# Patient Record
Sex: MALE | Race: BLACK OR AFRICAN AMERICAN | NOT HISPANIC OR LATINO | Employment: FULL TIME | URBAN - METROPOLITAN AREA
[De-identification: names, ages, dates, MRNs, and addresses within clinical notes are randomized per-mention and may not be internally consistent; named-entity substitution may affect disease eponyms.]

---

## 2024-11-24 NOTE — PROGRESS NOTES
"Last visit 2022  #Erectile Dysfunction  -continue Cialis 20mg prn - med counseling done - refilled     f/u in 1 year    Today's visit:  #Erectile Dysfunction  -taking cialis 20mg prn --> 7/10 rigidity  -no priapism, pain  -mostly lasting long enough  -no back pain  -no new heart issues/ntg    Cardiology note x2 reviewed  Bmp, lipids from OSH reviewed     -s/p prostatectomy: no  -Hernia Repair? no  -on nitrates/NTG?: no  -smoker? no     -partner: yes, here     -Tried PDE5is?: Yes  ---Viagra/sildenafil - response: 50mg prn - harder but not as hard, Also tired ciails 10mg  - Libido/Desire: strong  - been on Testosterone /anabolic steroids?   -Pain or curvature in penis when erect: none  -Premature or delayed ejaculation: none     LDL high  E mildly high  labs all reviewed, otherwise wnl       Labs  No results found for: \"TESTOSTERONE\"  Lab Results   Component Value Date    LH 5.9 10/09/2021     Lab Results   Component Value Date    FSH 5.5 10/09/2021     No components found for: \"ESTRADIAL\"  Lab Results   Component Value Date    PSA 0.62 10/09/2021     No components found for: \"CBC\"  Lab Results   Component Value Date    PROLACTIN 6.8 10/09/2021     Lab Results   Component Value Date    HGBA1C 5.5 10/09/2021     No components found for: \"HEMATOCRIT\"      Medications:  No current outpatient medications on file.    Allergy:  Not on File     Exam  CONSTITUTIONAL:        No acute distress    HEAD:        Normocephalic and atraumatic    CHEST / RESPIRATORY      no excess work of breathing, no respiratory distress,    ABDOMEN / GASTROINTESTINAL:        Abdomen nondistended        Testicles descended bilaterally, nontender, no masses  Vasa palpable bilaterally  Penis circ'd, no lesions, no plaques      Assessment/Plan  #Erectile Dysfunction  -continue Cialis 20mg prn - med counseling done - refilled     f/u in 1 year with Lynda Montenegro Attestation  By signing my name below, Jessica MCKINNEY Scribe   attest that this " documentation has been prepared under the direction and in the presence of Aaron Romano MD.

## 2024-11-25 ENCOUNTER — OFFICE VISIT (OUTPATIENT)
Dept: UROLOGY | Facility: HOSPITAL | Age: 54
End: 2024-11-25
Payer: COMMERCIAL

## 2024-11-25 DIAGNOSIS — N52.9 ERECTILE DYSFUNCTION, UNSPECIFIED ERECTILE DYSFUNCTION TYPE: Primary | ICD-10-CM

## 2024-11-25 PROCEDURE — 99214 OFFICE O/P EST MOD 30 MIN: CPT | Performed by: UROLOGY

## 2024-11-25 RX ORDER — TADALAFIL 20 MG/1
20 TABLET ORAL AS NEEDED
Qty: 30 TABLET | Refills: 6 | Status: SHIPPED | OUTPATIENT
Start: 2024-11-25

## 2024-11-25 RX ORDER — TADALAFIL 20 MG/1
20 TABLET ORAL AS NEEDED
Qty: 30 TABLET | Refills: 6 | Status: SHIPPED | OUTPATIENT
Start: 2024-11-25 | End: 2024-11-25